# Patient Record
(demographics unavailable — no encounter records)

---

## 2024-11-01 NOTE — PHYSICAL EXAM
[General Appearance - Well Developed] : well developed [Normal Appearance] : normal appearance [Well Groomed] : well groomed [General Appearance - Well Nourished] : well nourished [No Deformities] : no deformities [General Appearance - In No Acute Distress] : no acute distress [Normal Conjunctiva] : the conjunctiva exhibited no abnormalities [Eyelids - No Xanthelasma] : the eyelids demonstrated no xanthelasmas [Normal Oral Mucosa] : normal oral mucosa [No Oral Pallor] : no oral pallor [No Oral Cyanosis] : no oral cyanosis [Normal Jugular Venous A Waves Present] : normal jugular venous A waves present [Normal Jugular Venous V Waves Present] : normal jugular venous V waves present [No Jugular Venous Sousa A Waves] : no jugular venous sousa A waves [Respiration, Rhythm And Depth] : normal respiratory rhythm and effort [Exaggerated Use Of Accessory Muscles For Inspiration] : no accessory muscle use [Auscultation Breath Sounds / Voice Sounds] : lungs were clear to auscultation bilaterally [Heart Rate And Rhythm] : heart rate and rhythm were normal [Heart Sounds] : normal S1 and S2 [Abdomen Soft] : soft [Abdomen Tenderness] : non-tender [Abdomen Mass (___ Cm)] : no abdominal mass palpated [Abnormal Walk] : normal gait [Gait - Sufficient For Exercise Testing] : the gait was sufficient for exercise testing [Nail Clubbing] : no clubbing of the fingernails [Cyanosis, Localized] : no localized cyanosis [Petechial Hemorrhages (___cm)] : no petechial hemorrhages [Skin Color & Pigmentation] : normal skin color and pigmentation [] : no rash [No Venous Stasis] : no venous stasis [Skin Lesions] : no skin lesions [No Skin Ulcers] : no skin ulcer [No Xanthoma] : no  xanthoma was observed [Oriented To Time, Place, And Person] : oriented to person, place, and time [Affect] : the affect was normal [Mood] : the mood was normal [No Anxiety] : not feeling anxious [FreeTextEntry1] : 4/6 systolic ejection murmur

## 2024-11-01 NOTE — CARDIOLOGY SUMMARY
[___] : [unfilled] [de-identified] : 11/1/2024: Sinus Rhythm  WITHIN NORMAL LIMITS  8 20 2024:  Sinus Rhythm  WITHIN NORMAL LIMITS   2 14 2023  Sinus  Rhythm  WITHIN NORMAL LIMITS  [de-identified] : apr 2023:  6 beats NSVT  [de-identified] : 21 feb 2023L:   7 mets :  109% MPHR.  DTS 6 low risk.  normal treadmill stres  [de-identified] : feb 2023:  Normal LV function normal RV . mild MR.  moderate aortic valve stenosis.   mild AI.  [de-identified] : 2000:  coronary stent RCA  [de-identified] : carotid Stent : 2020: rgith carotid stent.

## 2024-11-01 NOTE — DISCUSSION/SUMMARY
[Patient] : the patient [Risks] : risks [Benefits] : benefits [Alternatives] : alternatives [With Me] : with me [___ Year(s)] : in [unfilled] year(s) [FreeTextEntry1] : This is 85 year old male with history of coronary artery disease , s/p PCi for stable angina , 2000, and 5carotid stent 2020 (d ue to injury), here for Pre-operative cardiovascular risk evaluation and management for hernia surgery    1)  elevated blood pressure without diagnosis of hypertension :  salt restriction recheck the BP at home.  patien refuses to take Bp meds.  3) coronary artery disease : stable. statins.  stop aspirin  4) moderate aortic stenosis murmur. not symptiomatuic.   repeat transthoracic echocardiogram  5) carotid stenosis:  c prior stnet.  carot dUplex.  stop  aspirin ./ statins.  patient on eliquis for atrial fibrillation  6) parox afib:  eliquis.

## 2025-05-01 NOTE — HISTORY OF PRESENT ILLNESS
[FreeTextEntry1] : Pre-operative cardiovascular risk evaluation and management for [facility].   HPI for today: :    5 1 2025:    he had a TIA   oct 2024.   he had symptoms.  of  he was in the cellar.  and suddenly he felt sensation of heat in the fore head. and he was replacing a tool in the[place] box.  and he realized.  unsteady.  off balance  he went up the stairs.  he told his wife.  he told that ": i think he is getitn the stroke"  adn then he lost his pseech for few secs.  adn he regained his speech later.    dr leyva in [place]. neurologist came around.  3 mths later in jan.  he had similar symptoms. and he had a stroke.  he was  taking eliwuis./  he was on [place]  for 5 days.  he was good shape. he went ot [facility].  and he was at [facility] for 5 days.  small artery in the [place].  got blocked.   recently, he was not feelign right and shortness of breathe  ulices had gone to the hospital [facility] [facility]. .  he was feeling dyspnea on exertion . was diagnosed with pneumonia he was at [facility]    old note:  8 20 2204:  he does 200 push ups a day.  he does 62819 steps a day.  he does light dumbell  no chest pain . no dyspnea. no diziznes.  100 squats a day.    old nopte:  he had a stress test and during baseline ECg was found to have afib.   he was started on anticoagulation adn coreg.   he was put on 4 weeks event monitor.   no atrial fibrillation .  he stopped the beta blocker due to constip[ation.    old note: This is 83 year old male with history of coronary artery disease , s/p PCi for stable angina , 2000, and carotid stent 2020 (d ue to injury), here for Pre-operative cardiovascular risk evaluation and management for hernia surgery denies any chest pain. no dyspnea on exertion . no syncope.  bikes every day.  good exercise capacity. compliant with meds.   takes aspirin and statins.  No smoking. No alcohol. No drugs.   Family history:  Father:  no myocardial infarction. no Cerebro vascular accident  Mother :  no myocardial infarction. No cerebro vascualr accident Siblings:  No myocardial infarction.  No CVA

## 2025-05-01 NOTE — HISTORY OF PRESENT ILLNESS
[FreeTextEntry1] : Pre-operative cardiovascular risk evaluation and management for [facility].   HPI for today: :    5 1 2025:    he had a TIA   oct 2024.   he had symptoms.  of  he was in the cellar.  and suddenly he felt sensation of heat in the fore head. and he was replacing a tool in [email or web address] box.  and he realized.  unsteady.  off balance  he went up the stairs.  he told his wife.  he told that ": i think he is getitn the stroke"  adn then he lost his pseech for few secs.  adn he regained his speech later.    dr leyva in [place]. neurologist came around.  3 mths later in jan.  he had similar symptoms. and he had a stroke.  he was  taking eliwuis./  he was on [place]  for 5 days.  he was good shape. he went ot [facility].  and he was at [facility] for 5 days.  small artery in the [place].  got blocked.   recently, he was not feelign right and shortness of breathe  ul[facility] had gone to the hospital [place] [facility]. .  he was feeling dyspnea on exertion . was diagnosed with pneumonia he was at [facility]    old note:  8 20 2204:  he does 200 push ups a day.  he does 91311 steps a day.  he does light dumbell  no chest pain . no dyspnea. no diziznes.  100 squats a day.    old nopte:  he had a stress test and during baseline ECg was found to have afib.   he was started on anticoagulation adn coreg.   he was put on 4 weeks event monitor.   no atrial fibrillation .  he stopped the beta blocker due to constip[ation.    old note: This is 83 year old male with history of coronary artery disease , s/p PCi for stable angina , 2000, and carotid stent 2020 (d ue to injury), here for Pre-operative cardiovascular risk evaluation and management for hernia surgery denies any chest pain. no dyspnea on exertion . no syncope.  bikes every day.  good exercise capacity. compliant with meds.   takes aspirin and statins.  No smoking. No alcohol. No drugs.   Family history:  Father:  no myocardial infarction. no Cerebro vascular accident  Mother :  no myocardial infarction. No cerebro vascualr accident Siblings:  No myocardial infarction.  No CVA

## 2025-05-01 NOTE — PHYSICAL EXAM
[General Appearance - Well Developed] : well developed [Normal Appearance] : normal appearance [Well Groomed] : well groomed [General Appearance - Well Nourished] : well nourished [No Deformities] : no deformities [General Appearance - In No Acute Distress] : no acute distress [Normal Conjunctiva] : the conjunctiva exhibited no abnormalities [Eyelids - No Xanthelasma] : the eyelids demonstrated no xanthelasmas [Normal Oral Mucosa] : normal oral mucosa [No Oral Pallor] : no oral pallor [No Oral Cyanosis] : no oral cyanosis [Normal Jugular Venous A Waves Present] : normal jugular venous A waves present [Normal Jugular Venous V Waves Present] : normal jugular venous V waves present [No Jugular Venous Sousa A Waves] : no jugular venous sousa A waves [Respiration, Rhythm And Depth] : normal respiratory rhythm and effort [Exaggerated Use Of Accessory Muscles For Inspiration] : no accessory muscle use [Auscultation Breath Sounds / Voice Sounds] : lungs were clear to auscultation bilaterally [Heart Rate And Rhythm] : heart rate and rhythm were normal [Heart Sounds] : normal S1 and S2 [FreeTextEntry1] : 4/6 systolic ejection murmur   [Abdomen Soft] : soft [Abdomen Tenderness] : non-tender [Abdomen Mass (___ Cm)] : no abdominal mass palpated [Abnormal Walk] : normal gait [Gait - Sufficient For Exercise Testing] : the gait was sufficient for exercise testing [Nail Clubbing] : no clubbing of the fingernails [Cyanosis, Localized] : no localized cyanosis [Petechial Hemorrhages (___cm)] : no petechial hemorrhages [Skin Color & Pigmentation] : normal skin color and pigmentation [] : no rash [No Venous Stasis] : no venous stasis [Skin Lesions] : no skin lesions [No Skin Ulcers] : no skin ulcer [No Xanthoma] : no  xanthoma was observed [Oriented To Time, Place, And Person] : oriented to person, place, and time [Affect] : the affect was normal [Mood] : the mood was normal [No Anxiety] : not feeling anxious

## 2025-05-01 NOTE — ADDENDUM
[FreeTextEntry1] : 2 21 2023: Pre-operative cardiovascular risk evaluation and management : hernia surgery \par  Norm,al stress test   proceed for surgery as indicated. no further cardiac work up is needed.\par  . however,. patient has new onset  atrial fibrillation . and slightkly elevated BP prior th estress test.\par  initaite coreg 6.25 BId.  starting today\par  anticoagulation for atrial fibrillation . CHADSVASC is >2 .  anticoagulation can be started after surgery on discharge from hospital.\par

## 2025-05-01 NOTE — CARDIOLOGY SUMMARY
[___] : [unfilled] [de-identified] : 5 1 2025:  Sinus Rhythm  WITHIN NORMAL LIMITS    8 20 2024:  Sinus Rhythm  WITHIN NORMAL LIMITS   2 14 2023  Sinus  Rhythm  WITHIN NORMAL LIMITS  [de-identified] : apr 2023:  6 beats NSVT  [de-identified] : feb 2023:  Normal LV function normal RV . mild MR.  moderate aortic valve stenosis.   mild AI.  [de-identified] : 21 feb 2023L:   7 mets :  109% MPHR.  DTS 6 low risk.  normal treadmill stres  [de-identified] : 2000:  coronary stent RCA  [de-identified] : carotid Stent : 2020: rgith carotid stent.  [de-identified] :  lipo levle: elevated 180

## 2025-05-01 NOTE — DISCUSSION/SUMMARY
[Patient] : the patient [Risks] : risks [Benefits] : benefits [Alternatives] : alternatives [With Me] : with me [EKG obtained to assist in diagnosis and management of assessed problem(s)] : EKG obtained to assist in diagnosis and management of assessed problem(s) [___ Month(s)] : in [unfilled] month(s) [FreeTextEntry1] : This is 85 year old male with history of coronary artery disease , s/p PCi for stable angina , 2000, and 5carotid stent 2020 (d ue to injury), here for Pre-operative cardiovascular risk evaluation and management for hernia surgery    1)   New CVA  despite anticoagulation . now on aspirin.  reason  unclear cause.  was told likely age and small vessel.   2)  elevated blood pressure without diagnosis of hypertension :  ct monitorig.   salt restriction recheck the BP at home.  patien refuses to take Bp meds.    at home his BP is good. 117/118.   3) coronary artery disease : stable. statins.     4) moderate aortic stenosis murmur. not symptiomatuic.   repeat transthoracic echocardiogram  5) carotid stenosis:  c prior stent.     restart   aspirin . as he head stroke despite being on eliquis.  / statins.  patient on eliquis for atrial fibrillation  6) parox afib:  eliquis.  CVA despite eliquis.  intiatie aspirin.

## 2025-05-01 NOTE — CARDIOLOGY SUMMARY
[___] : [unfilled] [de-identified] : 5 1 2025:  Sinus Rhythm  WITHIN NORMAL LIMITS    8 20 2024:  Sinus Rhythm  WITHIN NORMAL LIMITS   2 14 2023  Sinus  Rhythm  WITHIN NORMAL LIMITS  [de-identified] : apr 2023:  6 beats NSVT  [de-identified] : 21 feb 2023L:   7 mets :  109% MPHR.  DTS 6 low risk.  normal treadmill stres  [de-identified] : feb 2023:  Normal LV function normal RV . mild MR.  moderate aortic valve stenosis.   mild AI.  [de-identified] : 2000:  coronary stent RCA  [de-identified] : carotid Stent : 2020: rgith carotid stent.  [de-identified] :  lipo levle: elevated 180

## 2025-05-01 NOTE — DISCUSSION/SUMMARY
[Patient] : the patient [Risks] : risks [Benefits] : benefits [Alternatives] : alternatives [With Me] : with me [___ Month(s)] : in [unfilled] month(s) [FreeTextEntry1] : This is 85 year old male with history of coronary artery disease , s/p PCi for stable angina , 2000, and 5carotid stent 2020 (d ue to injury), here for Pre-operative cardiovascular risk evaluation and management for hernia surgery    1)   New CVA  despite anticoagulation . now on aspirin.  reason  unclear cause.  was told likely age and small vessel.   2)  elevated blood pressure without diagnosis of hypertension :  ct monitorig.   salt restriction recheck the BP at home.  patien refuses to take Bp meds.    at home his BP is good. 117/118.   3) coronary artery disease : stable. statins.     4) moderate aortic stenosis murmur. not symptiomatuic.   repeat transthoracic echocardiogram  5) carotid stenosis:  c prior stent.     restart   aspirin . as he head stroke despite being on eliquis.  / statins.  patient on eliquis for atrial fibrillation  6) parox afib:  eliquis.  CVA despite eliquis.  intiatie aspirin.  [EKG obtained to assist in diagnosis and management of assessed problem(s)] : EKG obtained to assist in diagnosis and management of assessed problem(s)